# Patient Record
Sex: FEMALE
[De-identification: names, ages, dates, MRNs, and addresses within clinical notes are randomized per-mention and may not be internally consistent; named-entity substitution may affect disease eponyms.]

---

## 2018-11-15 ENCOUNTER — HOSPITAL ENCOUNTER (INPATIENT)
Dept: HOSPITAL 14 - H.ER | Age: 58
LOS: 2 days | Discharge: HOME | DRG: 343 | End: 2018-11-17
Payer: COMMERCIAL

## 2018-11-15 DIAGNOSIS — K35.80: Primary | ICD-10-CM

## 2018-11-15 DIAGNOSIS — N28.1: ICD-10-CM

## 2018-11-15 DIAGNOSIS — R10.11: ICD-10-CM

## 2018-11-15 DIAGNOSIS — E78.00: ICD-10-CM

## 2018-11-15 DIAGNOSIS — E78.5: ICD-10-CM

## 2018-11-15 DIAGNOSIS — Z79.02: ICD-10-CM

## 2018-11-15 DIAGNOSIS — Z86.73: ICD-10-CM

## 2018-11-15 DIAGNOSIS — I25.10: ICD-10-CM

## 2018-11-15 DIAGNOSIS — D25.9: ICD-10-CM

## 2018-11-15 DIAGNOSIS — I10: ICD-10-CM

## 2018-11-15 LAB
ALBUMIN SERPL-MCNC: 4.4 G/DL (ref 3.5–5)
ALBUMIN/GLOB SERPL: 1.3 {RATIO} (ref 1–2.1)
ALT SERPL-CCNC: 32 U/L (ref 9–52)
AST SERPL-CCNC: 27 U/L (ref 14–36)
BASOPHILS # BLD AUTO: 0 K/UL (ref 0–0.2)
BASOPHILS NFR BLD: 0.2 % (ref 0–2)
BUN SERPL-MCNC: 14 MG/DL (ref 7–17)
CALCIUM SERPL-MCNC: 9.4 MG/DL (ref 8.4–10.2)
EOSINOPHIL # BLD AUTO: 0.1 K/UL (ref 0–0.7)
EOSINOPHIL NFR BLD: 0.4 % (ref 0–4)
ERYTHROCYTE [DISTWIDTH] IN BLOOD BY AUTOMATED COUNT: 13.8 % (ref 11.5–14.5)
GFR NON-AFRICAN AMERICAN: > 60
HGB BLD-MCNC: 13.2 G/DL (ref 12–16)
LIPASE SERPL-CCNC: 55 U/L (ref 23–300)
LYMPHOCYTE: 10 % (ref 20–50)
LYMPHOCYTES # BLD AUTO: 1.3 K/UL (ref 1–4.3)
LYMPHOCYTES NFR BLD AUTO: 9.1 % (ref 20–40)
MCH RBC QN AUTO: 28 PG (ref 27–31)
MCHC RBC AUTO-ENTMCNC: 31.6 G/DL (ref 33–37)
MCV RBC AUTO: 88.8 FL (ref 81–99)
MONOCYTE: 5 % (ref 0–10)
MONOCYTES # BLD: 0.8 K/UL (ref 0–0.8)
MONOCYTES NFR BLD: 5.6 % (ref 0–10)
NEUTROPHILS # BLD: 12.3 K/UL (ref 1.8–7)
NEUTROPHILS NFR BLD AUTO: 84.7 % (ref 50–75)
NEUTROPHILS NFR BLD AUTO: 85 % (ref 42–75)
NRBC BLD AUTO-RTO: 0.2 % (ref 0–0)
PLATELET # BLD EST: NORMAL 10*3/UL
PLATELET # BLD: 356 K/UL (ref 130–400)
PMV BLD AUTO: 7.5 FL (ref 7.2–11.7)
RBC # BLD AUTO: 4.69 MIL/UL (ref 3.8–5.2)
RBC MORPH BLD: NORMAL
TOTAL CELLS COUNTED BLD: 100
WBC # BLD AUTO: 14.5 K/UL (ref 4.8–10.8)

## 2018-11-15 NOTE — ED PDOC
HPI: Abdomen


Time Seen by Provider: 11/15/18 18:26


Chief Complaint (Nursing): Abdominal Pain


Chief Complaint (Provider): abdominal pain


History Per: Patient


History/Exam Limitations: no limitations


Onset/Duration Of Symptoms: Hrs (x9)


Current Symptoms Are (Timing): Still Present


Location Of Pain/Discomfort: Other (upper)


Quality Of Discomfort: Sharp


Associated Symptoms: Nausea, Diarrhea (x1).  denies: Fever, Chills, Vomiting, 

Chest Pain, Urinary Symptoms


Additional Complaint(s): 





Ramona Tolbert is a 58 year old female, with a past medical history of CAD, HTN, 

and hypercholesterolemia, who presents to the emergency department complaining 

of upper abdominal pain onset for the last x9 hrs. Patient reports a 10/10 sharp

pain that shoots across bilateral upper quadrants and is associated with nausea 

and x1 episode of non bloody diarrhea. Patient states she's had some episodes of

dry-heaving but hasn't actually vomited food. She took no medications for 

symptoms prior to arrival and denies any prior history of similar symptoms. She 

denies any fever, chills, urinary symptoms, hematuria, vaginal bleeding or 

discharge, chest pain, shortness of breath, cough, headache, dizziness, recent 

travel, sick contacts or prior abdominal surgeries. No further medical 

complaints.





PMD: Nelson Abraham





Past Medical History


Reviewed: Historical Data, Nursing Documentation, Vital Signs


Vital Signs: 





                                Last Vital Signs











Temp  96.2 F L  11/15/18 18:04


 


Pulse  104 H  11/15/18 18:04


 


Resp  16   11/15/18 18:04


 


BP  143/72   11/15/18 18:04


 


Pulse Ox  100   11/15/18 18:04














- Medical History


PMH: CAD, Hypercholesterolemia





- Surgical History


Other surgeries: knee arthroscopy





- Family History


Family History: States: Unknown Family Hx





- Home Medications


Home Medications: 


                                Ambulatory Orders











 Medication  Instructions  Recorded


 


Clopidogrel [Plavix] 75 mg PO DAILY 11/15/18


 


RX: Pravastatin Sodium [Pravachol] 40 mg PO HS 11/15/18














- Allergies


Allergies/Adverse Reactions: 


                                    Allergies











Allergy/AdvReac Type Severity Reaction Status Date / Time


 


No Known Allergies Allergy   Verified 11/15/18 18:04














Review of Systems


ROS Statement: Except As Marked, All Systems Reviewed And Found Negative


Constitutional: Negative for: Fever, Chills


Cardiovascular: Negative for: Chest Pain


Respiratory: Negative for: Cough, Shortness of Breath


Gastrointestinal: Positive for: Nausea, Abdominal Pain, Diarrhea (x1).  Negative

 for: Vomiting


Genitourinary Female: Negative for: Dysuria, Frequency, Incontinence, Hematuria,

 Vaginal Discharge, Vaginal Bleeding


Neurological: Negative for: Headache, Dizziness





Physical Exam





- Reviewed


Nursing Documentation Reviewed: Yes


Vital Signs Reviewed: Yes





- Physical Exam


Comments: 


GENERAL APPEARANCE: Patient is awake, alert, oriented x 3, in mild painful distr

ess. 


SKIN:  Warm, dry; (-) cyanosis.


EYES:  (-) conjunctival pallor, (-) scleral icterus.


ENMT:  Mucous membranes moist.


NECK:  Supple, FROM


CHEST AND RESPIRATORY:  (-) rales, (-) rhonchi, (-) wheezes; breath sounds equal

 bilaterally. Respirations even and nonlabored. 


HEART AND CARDIOVASCULAR:  (-) irregularity


ABDOMEN AND GI: Soft (-) distention.  Bowel sounds active x4; [+] tenderness in 

right and left upper quadrants. (-) guarding, (-) rebound, (-) palpable masses, 

(-) CVA tenderness.


EXTREMITIES:  (-) deformity, (-) edema


NEURO AND PSYCH:  Mental status as above; (-) focal findings. Gait: steady. 

Speech: clear.





- Laboratory Results


Result Diagrams: 


                                 11/15/18 19:30





                                 11/15/18 19:30


Urine dip results: Negative for: Leukocyte Esterase, Blood, Nitrate, Ketones, 

Glucose, Bilirubin, Protein





- ECG


O2 Sat by Pulse Oximetry: 100 (RA)


Pulse Ox Interpretation: Normal





Medical Decision Making


Medical Decision Making: 


Time: 18:25


Initial Impression: abdominal pain and nausea, r/o gallbladder disease


Initial plan:


--CMP


--Lipase


--Urine dipstick


--CBC w/ differential


--Morphine 2 mg IVP


--Sodium Chloride 1,000 ml  mls/hr


--Protonix Inj 40 mg IVP


--Zofran inj 4 mg IVP


--Abdomen Limited [US]


--Reevaluation





2020


CBC with leukocytosis. CMP grossly unremarkable. Elevated lactate 2.6. Lipase 

WNL.


Repeat HR: 98


Pending U/S report.


On re-evaluation, patient resting comfortably - reports resolution of nausea and

 slight improvement in pain.








20:45


Abdomen Ultrasound


COMMENTS: 


Excessive gas is visualized. 


The liver is of uniform echo texture without evidence of mass or defect and 

appears slightly enlarged measuring 18.37 cm.  There is no intra or extrahepatic

 biliary ductal dilatation.  The common bile duct measures 0.51 cm.  The 

gallbladder is physiologically distended without evidence of calculi. The 

gallbladder wall is not thickened and there is no pericholecystic fluid.   


The visualized portions of abdominal aorta and inferior vena cava present no 

abnormalities. 


The visualized portions of the pancreas are unremarkable.  The spleen is of 

uniform echo texture and does not appear enlarged measuring 11.5 cm.  The right 

kidney measures 9.71 x 3.83 x 4.2 cm and the left kidney measures 10.08 x 3.84 x

 4.12 cm.  Right mid pole renal cyst measures 1.88 x 1.93 x 2.03 cm.  Both k

idneys are free of hydronephrosis. 


IMPRESSION: 


1.  Slightly enlarged liver. 


2.  Right renal cyst.





Ultrasound reviewed, CT ordered to further r/o abdominal pathology.


Patient to remain NPO.





2245


EXAM: 


CT Abdomen and Pelvis with IV contrast 


CLINICAL HISTORY: 


Abd pain  nausea 


TECHNIQUE: 


Axial computed tomography images of the abdomen and pelvis with intravenous 

contrast. 


CONTRAST: 


With intravenous contrast. 


COMPARISON: 


None provided. 


FINDINGS: 


LUNG BASES: 


The lung bases appear clear. No pleural effusions are seen. 


LIVER: 


Unremarkable. 


GALLBLADDER AND BILE DUCTS: 


The gallbladder appears within normal limits. No radioopaque gallstones are 

seen. No biliary ductal dilatation is evident. 


PANCREAS: 


Unremarkable. 


SPLEEN: 


Unremarkable. 


ADRENAL GLANDS: 


Unremarkable. 


KIDNEYS, URETERS, AND BLADDER: 


The kidneys appear within normal limits. There is no hydronephrosis or hydr

oureter. No urinary calculi are seen. Several cysts are present in the right 

kidney largest measures 2 cm. 


STOMACH AND BOWEL: 


Unremarkable appearance of the stomach and bowel. No evidence of bowel o

bstruction. No evidence suggesting enteritis or colitis. 


APPENDIX: 


Thick-walled fluid-filled inflamed appendix with more adjacent stranding 

compatible with acute appendicitis. No perforation or abscess. 


PERITONEUM: 


No free fluid. No free air. 


LYMPH NODES: 


No lymphadenopathy is evident. 


REPRODUCTIVE: 


Uterus is enlarged bulky and contains multiple masses consistent with fibroids 


VASCULATURE: 


No evidence of abdominal aortic aneurysm. 


BONES: 


No aggressive appearing osseous lesion. No acute osseous pathology evident. 


Large herniated discs at L5-S1 and L4-L5 with biforaminal and canal stenosis. 





IMPRESSION: 


1. Acute appendicitis. No perforation or abscess. 


2. Fibroid  uterus. 


Findings reported to JACY Yap 10:32pm EST.


Electronically signed on Nov 15, 2018 10:34:22 PM EST by:


Lobito Kuhn M.D., MBA Certified By [facility] & [facility]


Fellowship Trained MRI and CT Specialist





Consult placed to surgical resident.


Zosyn IVPB ordered.





2305


Case discussed with Dr CORRINE Carrillo, surgical resident. Agreeable to evaluation in

 ED.





2310


Case discussed with general surgeon on call, Dr Montelongo. Agreeable to admission 

to med/surg.


Patient agreeable to admission. Arrangements made for admission.


Udip reviewed and unremarkable.


----------------------------------------------------

---------------------------------------------   


Scribe Attestation:


Documented by Justo Barone, acting as a scribe for Renetta Yap PA-C





Provider Scribe Attestation:


All medical record entries made by the Scribe were at my direction and 

personally dictated by me. I have reviewed the chart and agree that the record 

accurately reflects my personal performance of the history, physical exam, 

medical decision making, and the department course for this patient. I have also

 personally directed, reviewed, and agree with the discharge instructions and 

disposition.





Disposition





- Clinical Impression


Clinical Impression: 


 Appendicitis








- Patient ED Disposition


Is Patient to be Admitted: Yes


Counseled Patient/Family Regarding: Studies Performed, Diagnosis





- Disposition


Disposition Time: 23:13


Condition: FAIR





- Pt Status Changed To:


Hospital Disposition Of: Inpatient





- Admit Certification


Admit to Inpatient:: After my assessment, the patient will require 

hospitalization for at least two midnights.  This is because of the severity of 

symptoms shown, intensity of services needed, and/or the medical risk in this 

patient being treated as an outpatient.





- POA


Present On Arrival: None





Results





- Lab Results


Lab Results: 

















  11/15/18 11/15/18 11/15/18





  19:30 19:30 19:30


 


WBC    14.5 H


 


RBC    4.69


 


Hgb    13.2


 


Hct    41.6


 


MCV    88.8


 


MCH    28.0


 


MCHC    31.6 L


 


RDW    13.8


 


Plt Count    356


 


MPV    7.5


 


Neut % (Auto)    84.7 H


 


Lymph % (Auto)    9.1 L


 


Mono % (Auto)    5.6


 


Eos % (Auto)    0.4


 


Baso % (Auto)    0.2


 


Neut # (Auto)    12.3 H


 


Lymph # (Auto)    1.3


 


Mono # (Auto)    0.8


 


Eos # (Auto)    0.1


 


Baso # (Auto)    0.0


 


Neutrophils % (Manual)    85 H


 


Lymphocytes % (Manual)    10 L


 


Monocytes % (Manual)    5


 


Platelet Estimate    Normal


 


RBC Morphology    Normal


 


Sodium   138 


 


Potassium   4.9 


 


Chloride   105 


 


Carbon Dioxide   22 


 


Anion Gap   16 


 


BUN   14 


 


Creatinine   0.6 L 


 


Est GFR ( Amer)   > 60 


 


Est GFR (Non-Af Amer)   > 60 


 


Random Glucose   96 


 


Lactic Acid  2.6 H  


 


Calcium   9.4 


 


Total Bilirubin   0.8 


 


AST   27 


 


ALT   32 


 


Alkaline Phosphatase   81 


 


Total Protein   7.9 


 


Albumin   4.4 


 


Globulin   3.5 


 


Albumin/Globulin Ratio   1.3 


 


Lipase   55

## 2018-11-15 NOTE — CP.PCM.CON
History of Present Illness





- History of Present Illness


History of Present Illness: 





General Surgery Consult note for Dr. Montelongo


   Consulted for Appendicitis





Patient is a 58 yr old female with PMH HLD, carotid artery stenosis and TIA six 

years ago who presented to [facility] ED with sharp RLQ abdominal pain beginning this 

am upon awakening radiating to the RUQ.  The pain is associated with nausea and 

NBNB emesis x2.  Last normal BM this morning soft, brown, no blood. Patient 

denies f/c, HA, dizziness, CP, sob, extremity pain or weakness. 


   


Of note the patient was started on Plavix approximately 2 months ago by her car

diologist (Dr. Llanos) for stroke prevention.





PMH: HLD, carotid artery stenosis 50-69%, TIA 2012


PSH: right knee arthroscopy


All: NKDA


Medications: Plavix, Statin 





Review of Systems





- Review of Systems


All systems: reviewed and no additional remarkable complaints except (as per 

HPI)





Past Patient History





- Past Social History


Smoking Status: Never Smoked





- CARDIAC


Hx Hypercholesterolemia: Yes





- PSYCHIATRIC


Hx Substance Use: No





Meds


Allergies/Adverse Reactions: 


                                    Allergies











Allergy/AdvReac Type Severity Reaction Status Date / Time


 


No Known Allergies Allergy   Verified 11/15/18 18:04














- Medications


Medications: 


                               Current Medications





Piperacillin Sod/Tazobactam (Sod 3.375 gm/ Sodium Chloride)  100 mls @ 100 

mls/hr IVPB STAT STA; Protocol


   Stop: 11/15/18 23:41


   Last Admin: 11/15/18 22:46 Dose:  100 mls/hr











Physical Exam





- Constitutional


Appears: Well, Non-toxic, No Acute Distress





- Head Exam


Head Exam: ATRAUMATIC, NORMOCEPHALIC





- Eye Exam


Eye Exam: EOMI





- ENT Exam


ENT Exam: Mucous Membranes Moist





- Respiratory Exam


Respiratory Exam: NORMAL BREATHING PATTERN





- Cardiovascular Exam


Cardiovascular Exam: REGULAR RHYTHM





- GI/Abdominal Exam


GI & Abdominal Exam: Guarding (RLQ), Rebound, Soft, Tenderness (RLQ and RUQ 

tenderness).  absent: Distended, Rigid


Additional comments: 





Rovsing sign positive





- Extremities Exam


Extremities exam: Positive for: pedal pulses present.  Negative for: calf 

tenderness, pedal edema





- Neurological Exam


Neurological exam: Alert, Oriented x3





- Psychiatric Exam


Psychiatric exam: Anxious





- Skin


Skin Exam: Dry, Intact, Normal Color, Warm





Results





- Vital Signs


Recent Vital Signs: 


                                Last Vital Signs











Temp  98.9 F   11/15/18 20:23


 


Pulse  98 H  11/15/18 20:23


 


Resp  16   11/15/18 20:23


 


BP  137/74   11/15/18 20:23


 


Pulse Ox  100   11/15/18 23:31














- Labs


Result Diagrams: 


                                 11/15/18 19:30





                                 11/15/18 19:30


Labs: 


                         Laboratory Results - last 24 hr











  11/15/18 11/15/18 11/15/18





  19:30 19:30 19:30


 


WBC  14.5 H  


 


RBC  4.69  


 


Hgb  13.2  


 


Hct  41.6  


 


MCV  88.8  


 


MCH  28.0  


 


MCHC  31.6 L  


 


RDW  13.8  


 


Plt Count  356  


 


MPV  7.5  


 


Neut % (Auto)  84.7 H  


 


Lymph % (Auto)  9.1 L  


 


Mono % (Auto)  5.6  


 


Eos % (Auto)  0.4  


 


Baso % (Auto)  0.2  


 


Neut # (Auto)  12.3 H  


 


Lymph # (Auto)  1.3  


 


Mono # (Auto)  0.8  


 


Eos # (Auto)  0.1  


 


Baso # (Auto)  0.0  


 


Neutrophils % (Manual)  85 H  


 


Lymphocytes % (Manual)  10 L  


 


Monocytes % (Manual)  5  


 


Platelet Estimate  Normal  


 


RBC Morphology  Normal  


 


Sodium   138 


 


Potassium   4.9 


 


Chloride   105 


 


Carbon Dioxide   22 


 


Anion Gap   16 


 


BUN   14 


 


Creatinine   0.6 L 


 


Est GFR ( Amer)   > 60 


 


Est GFR (Non-Af Amer)   > 60 


 


Random Glucose   96 


 


Lactic Acid    2.6 H


 


Calcium   9.4 


 


Total Bilirubin   0.8 


 


AST   27 


 


ALT   32 


 


Alkaline Phosphatase   81 


 


Total Protein   7.9 


 


Albumin   4.4 


 


Globulin   3.5 


 


Albumin/Globulin Ratio   1.3 


 


Lipase   55 














Assessment & Plan





- Assessment and Plan (Free Text)


Assessment: 





58 yr old female with appendicitis


Plan: 


c/w IVF


c/w Zosyn


pain control


SCD


protonix


repeat labs in AM


plan for OR tomorrow afternoon


d/w Dr. Reginald Carrillo, PGY 1








- Date & Time


Date: 11/15/18


Time: 23:15

## 2018-11-16 LAB
ALBUMIN SERPL-MCNC: 3.9 G/DL (ref 3.5–5)
ALBUMIN/GLOB SERPL: 1.3 {RATIO} (ref 1–2.1)
ALT SERPL-CCNC: 33 U/L (ref 9–52)
APTT BLD: 29.2 SECONDS (ref 25.6–37.1)
AST SERPL-CCNC: 17 U/L (ref 14–36)
BASOPHILS # BLD AUTO: 0.1 K/UL (ref 0–0.2)
BASOPHILS NFR BLD: 0.7 % (ref 0–2)
BUN SERPL-MCNC: 11 MG/DL (ref 7–17)
CALCIUM SERPL-MCNC: 9.1 MG/DL (ref 8.4–10.2)
EOSINOPHIL # BLD AUTO: 0 K/UL (ref 0–0.7)
EOSINOPHIL NFR BLD: 0.3 % (ref 0–4)
ERYTHROCYTE [DISTWIDTH] IN BLOOD BY AUTOMATED COUNT: 13.4 % (ref 11.5–14.5)
GFR NON-AFRICAN AMERICAN: > 60
HGB BLD-MCNC: 12.1 G/DL (ref 12–16)
INR PPP: 1.1
LYMPHOCYTES # BLD AUTO: 2.1 K/UL (ref 1–4.3)
LYMPHOCYTES NFR BLD AUTO: 14.2 % (ref 20–40)
MCH RBC QN AUTO: 27.5 PG (ref 27–31)
MCHC RBC AUTO-ENTMCNC: 32.4 G/DL (ref 33–37)
MCV RBC AUTO: 84.9 FL (ref 81–99)
MONOCYTES # BLD: 0.8 K/UL (ref 0–0.8)
MONOCYTES NFR BLD: 5.6 % (ref 0–10)
NEUTROPHILS # BLD: 11.6 K/UL (ref 1.8–7)
NEUTROPHILS NFR BLD AUTO: 79.2 % (ref 50–75)
NRBC BLD AUTO-RTO: 0 % (ref 0–0)
PLATELET # BLD: 318 K/UL (ref 130–400)
PMV BLD AUTO: 7.2 FL (ref 7.2–11.7)
PROTHROMBIN TIME: 12.4 SECONDS (ref 9.8–13.1)
RBC # BLD AUTO: 4.4 MIL/UL (ref 3.8–5.2)
WBC # BLD AUTO: 14.6 K/UL (ref 4.8–10.8)

## 2018-11-16 PROCEDURE — 0DTJ4ZZ RESECTION OF APPENDIX, PERCUTANEOUS ENDOSCOPIC APPROACH: ICD-10-PCS

## 2018-11-16 RX ADMIN — SODIUM CHLORIDE, SODIUM LACTATE, POTASSIUM CHLORIDE, CALCIUM CHLORIDE AND DEXTROSE MONOHYDRATE SCH MLS/HR: 5; 600; 310; 30; 20 INJECTION, SOLUTION INTRAVENOUS at 01:23

## 2018-11-16 RX ADMIN — SODIUM CHLORIDE, SODIUM LACTATE, POTASSIUM CHLORIDE, CALCIUM CHLORIDE AND DEXTROSE MONOHYDRATE SCH MLS/HR: 5; 600; 310; 30; 20 INJECTION, SOLUTION INTRAVENOUS at 14:35

## 2018-11-16 RX ADMIN — SODIUM CHLORIDE, SODIUM LACTATE, POTASSIUM CHLORIDE, CALCIUM CHLORIDE AND DEXTROSE MONOHYDRATE SCH MLS/HR: 5; 600; 310; 30; 20 INJECTION, SOLUTION INTRAVENOUS at 09:18

## 2018-11-16 NOTE — CT
Date of service: 



11/15/2018



PROCEDURE:  CT Abdomen and Pelvis with contrast



HISTORY:

abd pain, nausea



COMPARISON:

Limited abdomen ultrasound 11/15/2018.



TECHNIQUE:

Following the intravenous administration of iodinated contrast 

material, a CT examination of the abdomen and pelvis performed from 

the domes of the diaphragms to the symphysis pubis with reformatted 

datasets provided in axial, sagittal and coronal planes. Oral 

contrast was not administered as per referring physician request. 

Coronal and sagittal reformats were generated.



Contrast dose: Omnipaque 300, 95 cc



Radiation dose:



Total exam DLP = 865.09 mGy-cm.



This CT exam was performed using one or more of the following dose 

reduction techniques: Automated exposure control, adjustment of the 

mA and/or kV according to patient size, and/or use of iterative 

reconstruction technique.



FINDINGS:



LOWER THORAX:

Unremarkable. 



LIVER:

Diminished attenuation is appreciated throughout the liver compatible 

with hepatic steatosis.  There is a small ovoid enhancing mass at the 

dome posteriorly, measuring 1.6 x 1.2 cm with remainder of the liver 

remarkable only for mild hepatomegaly.  No intrahepatic biliary 

dilatation or follow-up MRI is advised without contrast for 

additional characterization of this finding. 



GALLBLADDER AND BILE DUCTS:

Unremarkable. 



PANCREAS:

Unremarkable. No gross lesion or ductal dilatation.



SPLEEN:

Unremarkable. 



ADRENALS:

Unremarkable. No mass. 



KIDNEYS AND URETERS:

No obstructive uropathy bilaterally or definite solid mass or 

perinephric reaction.  2.3 cm cyst seen at the upper midpole right 

kidney laterally with a small lucency seen at the mid to lower pole 

medially, too small to characterize. 



VASCULATURE:

Unremarkable. No aortic aneurysm. No aortic atherosclerotic 

calcification or mural plaque present.



BOWEL:

Unremarkable. No obstruction. No gross mural thickening. 



APPENDIX:

Thick-walled distended appendix is appreciate with associated 

periappendiceal reactive changes at the midline to right hemipelvis 

no associated ascites, free intra peritoneal gas collection or 

abscess.  Findings are compatible with appendicitis. 



PERITONEUM:

Unremarkable. No free fluid. No free air. 



LYMPH NODES:

Unremarkable. No enlarged lymph nodes. 



BLADDER:

Unremarkable. 



REPRODUCTIVE:

Extensive fibroid uterine changes are identified partially calcified. 



BONES:

No acute fracture. 



OTHER FINDINGS:

None.



IMPRESSION:

1. Findings compatible with appendicitis without CT sign of rupture 

at this time. 



2. Extensive fibroid uterine disease. 



3. Hepatomegaly and hepatic steatosis.  1.6 cm enhancing lesion at 

the dome of the liver requires follow-up MRI with and without 

contrast for added characterization.  This was obscured by lung base 

in preliminary limited abdomen ultrasound 11/15/2018.  No peripheral 

enhancement pattern to suggest definitive benign hemangioma by CT at 

the time of imaging. 



4. 0.3 cm simple cyst upper midpole right kidney with mid to lower 

pole parenchymal lucency right kidney too small to characterize. 



Discordant preliminary report from [facility] (Impression No.  Three, 

hepatic mass), 11/15/2018. 



Discussed with Dr. Montelongo with written down read back verification 

11/16/2018 11:55 a.m..

## 2018-11-16 NOTE — CARD
--------------- APPROVED REPORT --------------





Date of service: 11/16/2018



EKG Measurement

Heart [id number]

NE [id number]

[id number]

[id number]

[id number]



<Conclusion>

Normal sinus rhythm

Normal ECG

## 2018-11-16 NOTE — CP.PCM.HP
History of Present Illness





- History of Present Illness


History of Present Illness: 





General Surgery H&P note for Dr. Montelongo


   Consulted for Appendicitis





Patient is a 58 yr old female with PMH HLD, carotid artery stenosis and TIA six 

years ago who presented to [facility] ED with sharp RLQ abdominal pain beginning this 

am upon awakening radiating to the RUQ.  The pain is associated with nausea and 

NBNB emesis x2.  Last normal BM this morning soft, brown, no blood. Patient 

denies f/c, HA, dizziness, CP, sob, extremity pain or weakness. 


   


Of note the patient was started on Plavix approximately 2 months ago by her 

cardiologist (Dr. Llanos) for stroke prevention.





PMH: HLD, carotid artery stenosis 50-69%, TIA 2012


PSH: right knee arthroscopy


All: NKDA


Medications: Plavix, Statin 








Present on Admission





- Present on Admission


Any Indicators Present on Admission: No





Review of Systems





- Review of Systems


All systems: reviewed and no additional remarkable complaints except (as per 

HPI)





Past Patient History





- Past Social History


Smoking Status: Never Smoked





- CARDIAC


Hx Hypercholesterolemia: Yes





- PSYCHIATRIC


Hx Substance Use: No





Meds


Allergies/Adverse Reactions: 


                                    Allergies











Allergy/AdvReac Type Severity Reaction Status Date / Time


 


No Known Allergies Allergy   Verified 11/15/18 18:04














Physical Exam





- Constitutional


Appears: Well, Non-toxic, No Acute Distress





- Head Exam


Head Exam: ATRAUMATIC, NORMOCEPHALIC





- Eye Exam


Eye Exam: EOMI





- ENT Exam


ENT Exam: Mucous Membranes Moist





- Respiratory Exam


Respiratory Exam: NORMAL BREATHING PATTERN





- Cardiovascular Exam


Cardiovascular Exam: REGULAR RHYTHM





- GI/Abdominal Exam


GI & Abdominal Exam: Guarding, Rebound, Soft, Tenderness (RLQ, RUQ).  absent: 

Distended, Firm, Rigid





- Extremities Exam


Extremities exam: Positive for: pedal pulses present.  Negative for: calf 

tenderness, pedal edema





- Neurological Exam


Neurological exam: Alert, Oriented x3





- Psychiatric Exam


Psychiatric exam: Normal Affect, Normal Mood





- Skin


Skin Exam: Dry, Intact, Normal Color, Warm





Results





- Vital Signs


Recent Vital Signs: 





                                Last Vital Signs











Temp  98.9 F   11/15/18 20:23


 


Pulse  98 H  11/15/18 20:23


 


Resp  16   11/15/18 20:23


 


BP  137/74   11/15/18 20:23


 


Pulse Ox  100   11/15/18 23:31














- Labs


Result Diagrams: 


                                 11/15/18 19:30





                                 11/15/18 19:30


Labs: 





                         Laboratory Results - last 24 hr











  11/15/18 11/15/18 11/15/18





  19:30 19:30 19:30


 


WBC  14.5 H  


 


RBC  4.69  


 


Hgb  13.2  


 


Hct  41.6  


 


MCV  88.8  


 


MCH  28.0  


 


MCHC  31.6 L  


 


RDW  13.8  


 


Plt Count  356  


 


MPV  7.5  


 


Neut % (Auto)  84.7 H  


 


Lymph % (Auto)  9.1 L  


 


Mono % (Auto)  5.6  


 


Eos % (Auto)  0.4  


 


Baso % (Auto)  0.2  


 


Neut # (Auto)  12.3 H  


 


Lymph # (Auto)  1.3  


 


Mono # (Auto)  0.8  


 


Eos # (Auto)  0.1  


 


Baso # (Auto)  0.0  


 


Neutrophils % (Manual)  85 H  


 


Lymphocytes % (Manual)  10 L  


 


Monocytes % (Manual)  5  


 


Platelet Estimate  Normal  


 


RBC Morphology  Normal  


 


Sodium   138 


 


Potassium   4.9 


 


Chloride   105 


 


Carbon Dioxide   22 


 


Anion Gap   16 


 


BUN   14 


 


Creatinine   0.6 L 


 


Est GFR ( Amer)   > 60 


 


Est GFR (Non-Af Amer)   > 60 


 


Random Glucose   96 


 


Lactic Acid    2.6 H


 


Calcium   9.4 


 


Total Bilirubin   0.8 


 


AST   27 


 


ALT   32 


 


Alkaline Phosphatase   81 


 


Total Protein   7.9 


 


Albumin   4.4 


 


Globulin   3.5 


 


Albumin/Globulin Ratio   1.3 


 


Lipase   55 














Assessment & Plan





- Assessment and Plan (Free Text)


Assessment: 


58 yr old female with appendicitis





Plan: 





IVF


continue Zosyn


pain control


SCD


protonix


repeat labs in AM


plan for OR tomorrow afternoon


d/w Dr. Reginald Carrillo, PGY 1








- Date & Time


Date: 11/15/18


Time: 23:15





Decision To Admit





- Pt Status Changed To:


Hospital Disposition Of: Inpatient





- Admit Certification


Admit to Inpatient:: After my assessment, the patient will require 

hospitalization for at least two midnights.  This is because of the severity of 

symptoms shown, intensity of services needed, and/or the medical risk in this 

patient being treated as an outpatient.





- .


Bed Request Type: Med/Surg


Admitting Physician: Bebeto Montelongo

## 2018-11-16 NOTE — RAD
Date of service: 



11/16/2018



HISTORY:

 preop 



COMPARISON:

None available.



FINDINGS:



LUNGS:

No active pulmonary disease.



PLEURA:

No significant pleural effusion identified, no pneumothorax apparent.



CARDIOVASCULAR:

No aortic atherosclerotic calcification present.



 Normal cardiac size. No pulmonary vascular congestion. 



OSSEOUS STRUCTURES:

No significant abnormalities.



VISUALIZED UPPER ABDOMEN:

Normal.



OTHER FINDINGS:

None.



IMPRESSION:

No acute cardiopulmonary disease appreciated.

## 2018-11-16 NOTE — US
Date of service: 



11/15/2018



HISTORY:

RUQ pain, r/o gallbladder disease



COMPARISON:

None.



TECHNIQUE:

Sonographic evaluation of the right upper quadrant of the abdomen.



FINDINGS:



LIVER:

Measures 18.4 cm in length. Patent portal vein. Portal venous flow: 

Hepatopetal. Unremarkable echogenicity of the liver parenchyma.  No 

mass. No intrahepatic bile duct dilatation. 



GALLBLADDER:

Unremarkable. No gallstones. 



COMMON BILE DUCT:

Measures 5.1 mm.  No stones. No dilatation.



PANCREAS:

Unremarkable as visualized. No mass. No ductal dilatation.



RIGHT KIDNEY:

Measures 3.8 x 9.7 cm in length. Normal echogenicity. No calculus, 

mass, or hydronephrosis.  Midpole cyst 1.9 x 1.9 cm 



AORTA:

No aneurysmal dilatation.



IVC:

Unremarkable.



OTHER FINDINGS:

None .



IMPRESSION:

No significant or acute  findings to account for/ related to the 

clinical presentation. Additional benign and/or incidental findings 

described above.



___________________________________________________



Concordant findings (preliminary report) provided by [facility].

## 2018-11-16 NOTE — PCM.[email or web address]
Surgeon's Initial Post Op Note





- Surgeon's Notes


Surgeon: Reginald


Assistant: Titi


Type of Anesthesia: General Endo


Pre-Operative Diagnosis: Appendicitis


Operative Findings: Acutely inflamed appendix


Post-Operative Diagnosis: same


Operation Performed: laparoscopic appendectomy


Specimen/Specimens Removed: appendix


Estimated Blood Loss: EBL {In ML}: 20


Blood Products Given: N/A


Drains Used: No Drains


Post-Op Condition: Good


Date of Surgery/Procedure: 11/16/18


Time of Surgery/Procedure: 18:53

## 2018-11-17 VITALS — SYSTOLIC BLOOD PRESSURE: 155 MMHG | DIASTOLIC BLOOD PRESSURE: 79 MMHG

## 2018-11-17 VITALS — RESPIRATION RATE: 20 BRPM | HEART RATE: 82 BPM | TEMPERATURE: 98.8 F | OXYGEN SATURATION: 100 %

## 2018-11-17 LAB
BASOPHILS # BLD AUTO: 0 K/UL (ref 0–0.2)
BASOPHILS NFR BLD: 0.3 % (ref 0–2)
BUN SERPL-MCNC: 14 MG/DL (ref 7–17)
CALCIUM SERPL-MCNC: 9.5 MG/DL (ref 8.4–10.2)
EOSINOPHIL # BLD AUTO: 0 K/UL (ref 0–0.7)
EOSINOPHIL NFR BLD: 0 % (ref 0–4)
ERYTHROCYTE [DISTWIDTH] IN BLOOD BY AUTOMATED COUNT: 13.3 % (ref 11.5–14.5)
GFR NON-AFRICAN AMERICAN: > 60
HGB BLD-MCNC: 11.1 G/DL (ref 12–16)
LYMPHOCYTES # BLD AUTO: 1.2 K/UL (ref 1–4.3)
LYMPHOCYTES NFR BLD AUTO: 11.4 % (ref 20–40)
MCH RBC QN AUTO: 28 PG (ref 27–31)
MCHC RBC AUTO-ENTMCNC: 33.2 G/DL (ref 33–37)
MCV RBC AUTO: 84.3 FL (ref 81–99)
MONOCYTES # BLD: 0.3 K/UL (ref 0–0.8)
MONOCYTES NFR BLD: 2.8 % (ref 0–10)
NEUTROPHILS # BLD: 9.1 K/UL (ref 1.8–7)
NEUTROPHILS NFR BLD AUTO: 85.5 % (ref 50–75)
NRBC BLD AUTO-RTO: 0.1 % (ref 0–0)
PLATELET # BLD: 322 K/UL (ref 130–400)
PMV BLD AUTO: 7.4 FL (ref 7.2–11.7)
RBC # BLD AUTO: 3.98 MIL/UL (ref 3.8–5.2)
WBC # BLD AUTO: 10.7 K/UL (ref 4.8–10.8)

## 2018-11-18 NOTE — OP
PROCEDURE DATE:  11/16/2018



SURGEON:  Bebeto Montelongo MD



ASSISTANT:  Lobito Walls DO



ANESTHESIA:  General.



ANESTHESIOLOGIST:  Dr. CORRINE Ross.



PREOPERATIVE DIAGNOSIS:  Acute appendicitis



POSTOPERATIVE DIAGNOSIS:  Acute appendicitis



PROCEDURE:  Laparoscopic appendectomy.



DESCRIPTION OF OPERATION:  With the patient in the supine position, under

adequate general anesthesia, the abdomen was prepped and draped in the

usual sterile manner.  Veress needle puncture was performed at the

umbilicus with insufflation to 15 cm water pressure of CO2 and a 5 mm

laparoscopic trocar was inserted via an infraumbilical incision.  Under

direct vision, 5 and 12 mm trocars were inserted in the left lower

quadrant.  The cecum was visualized.  It was noted medial within the

abdomen and was redundant.  The patient was placed in Trendelenburg

position and with elevation of the cecum, the appendix was identified.  It

appeared acutely inflamed and thickened and was adherent to the small bowel

mesentery beneath the cecum in its proximal portion and curved back and was

adherent to the lateral wall of the cecum distally.  The appendix was

grasped and elevated and dissected free of attachments using a combination

of the harmonic scalpel and blunt dissection.  The mesentery of the

appendix was then divided also using the harmonic scalpel with hemoclips at

the area of the appendiceal artery.  The appendix was then divided close to

the cecum using an Endo-MUSTAPHA stapler.  The appendix was placed in a specimen

retrieval bag and removed via the 12 mm port site.  The abdomen was gently

irrigated and suctioned and inspected for hemostasis.  The pneumoperitoneum

was released and the trocars were removed.  The 12 mm port site was closed

with a figure-of-eight fascial suture of 0 Vicryl.  All incisions were

closed with 4-0 Monocryl subcuticular sutures and Dermabond.  Dry sterile

dressings were applied.  The patient tolerated the procedure well and

transferred to the recovery room in stable condition.  Estimated blood loss

for the procedure was 20 mL.





__________________________________________

Bebeto Montelongo MD





DD:  11/18/2018 9:25:17

DT:  11/18/2018 9:28:26

Job # [id number]

## 2018-11-24 ENCOUNTER — HOSPITAL ENCOUNTER (EMERGENCY)
Dept: HOSPITAL 14 - H.ER | Age: 58
Discharge: HOME | End: 2018-11-24
Payer: COMMERCIAL

## 2018-11-24 VITALS
DIASTOLIC BLOOD PRESSURE: 78 MMHG | SYSTOLIC BLOOD PRESSURE: 128 MMHG | TEMPERATURE: 97 F | HEART RATE: 78 BPM | RESPIRATION RATE: 19 BRPM

## 2018-11-24 DIAGNOSIS — I25.10: ICD-10-CM

## 2018-11-24 DIAGNOSIS — N39.0: ICD-10-CM

## 2018-11-24 DIAGNOSIS — R42: Primary | ICD-10-CM

## 2018-11-24 DIAGNOSIS — E78.00: ICD-10-CM

## 2018-11-24 LAB
ALBUMIN SERPL-MCNC: 4.5 G/DL (ref 3.5–5)
ALBUMIN/GLOB SERPL: 1.3 {RATIO} (ref 1–2.1)
ALT SERPL-CCNC: 28 U/L (ref 9–52)
APTT BLD: 32.3 SECONDS (ref 25.6–37.1)
AST SERPL-CCNC: 19 U/L (ref 14–36)
BACTERIA #/AREA URNS HPF: (no result) /[HPF]
BASOPHILS # BLD AUTO: 0.1 K/UL (ref 0–0.2)
BASOPHILS NFR BLD: 0.8 % (ref 0–2)
BILIRUB UR-MCNC: NEGATIVE MG/DL
BUN SERPL-MCNC: 17 MG/DL (ref 7–17)
CALCIUM SERPL-MCNC: 9.7 MG/DL (ref 8.4–10.2)
COLOR UR: YELLOW
EOSINOPHIL # BLD AUTO: 0.4 K/UL (ref 0–0.7)
EOSINOPHIL NFR BLD: 3.7 % (ref 0–4)
ERYTHROCYTE [DISTWIDTH] IN BLOOD BY AUTOMATED COUNT: 13.6 % (ref 11.5–14.5)
GFR NON-AFRICAN AMERICAN: > 60
GLUCOSE UR STRIP-MCNC: (no result) MG/DL
HGB BLD-MCNC: 13.6 G/DL (ref 12–16)
INR PPP: 1
LEUKOCYTE ESTERASE UR-ACNC: (no result) LEU/UL
LYMPHOCYTES # BLD AUTO: 2.6 K/UL (ref 1–4.3)
LYMPHOCYTES NFR BLD AUTO: 27.3 % (ref 20–40)
MCH RBC QN AUTO: 28.4 PG (ref 27–31)
MCHC RBC AUTO-ENTMCNC: 33.3 G/DL (ref 33–37)
MCV RBC AUTO: 85.1 FL (ref 81–99)
MONOCYTES # BLD: 0.7 K/UL (ref 0–0.8)
MONOCYTES NFR BLD: 7 % (ref 0–10)
NEUTROPHILS # BLD: 5.8 K/UL (ref 1.8–7)
NEUTROPHILS NFR BLD AUTO: 61.2 % (ref 50–75)
NRBC BLD AUTO-RTO: 0.1 % (ref 0–0)
PH UR STRIP: 6 [PH] (ref 5–8)
PLATELET # BLD: 386 K/UL (ref 130–400)
PMV BLD AUTO: 7.4 FL (ref 7.2–11.7)
PROT UR STRIP-MCNC: NEGATIVE MG/DL
PROTHROMBIN TIME: 11 SECONDS (ref 9.8–13.1)
RBC # BLD AUTO: 4.8 MIL/UL (ref 3.8–5.2)
RBC # UR STRIP: NEGATIVE /UL
SP GR UR STRIP: 1.02 (ref 1–1.03)
SQUAMOUS EPITHIAL: 1 /HPF (ref 0–5)
URINE AMORPHOUS SEDIMENT: (no result) /UL
URINE CLARITY: (no result)
UROBILINOGEN UR-MCNC: (no result) MG/DL (ref 0.2–1)
WBC # BLD AUTO: 9.4 K/UL (ref 4.8–10.8)

## 2018-11-24 PROCEDURE — 87086 URINE CULTURE/COLONY COUNT: CPT

## 2018-11-24 PROCEDURE — 85025 COMPLETE CBC W/AUTO DIFF WBC: CPT

## 2018-11-24 PROCEDURE — 80053 COMPREHEN METABOLIC PANEL: CPT

## 2018-11-24 PROCEDURE — 93005 ELECTROCARDIOGRAM TRACING: CPT

## 2018-11-24 PROCEDURE — 85610 PROTHROMBIN TIME: CPT

## 2018-11-24 PROCEDURE — 81003 URINALYSIS AUTO W/O SCOPE: CPT

## 2018-11-24 PROCEDURE — 96374 THER/PROPH/DIAG INJ IV PUSH: CPT

## 2018-11-24 PROCEDURE — 99285 EMERGENCY DEPT VISIT HI MDM: CPT

## 2018-11-24 PROCEDURE — 85730 THROMBOPLASTIN TIME PARTIAL: CPT

## 2018-11-24 NOTE — CARD
--------------- APPROVED REPORT --------------





Date of service: 11/24/2018



EKG Measurement

Heart [id number]

[id number]

[id number]

[id number]

[id number]



<Conclusion>

Normal sinus rhythm

Possible Left atrial enlargement

Borderline ECG

## 2018-11-24 NOTE — ED PDOC
HPI: Neurologic





- General


Time Seen by Provider: 18 08:33


Chief Complaint (Nursing): Dizziness/Lightheaded


Chief Complaint (Provider): Dizziness/Lightheaded


Source: patient


Exam Limitations: no limitations





- History of Present Illness


Timing/Duration: 1 week


Allergies/Adverse Reactions: 


Allergies





No Known Allergies Allergy (Verified 11/15/18 18:04)


   








Home Medications: 


Ambulatory Orders





Clopidogrel [Plavix] 75 mg PO DAILY 11/15/18 


Pravastatin Sodium [Pravachol] 40 mg PO HS 11/15/18 


Nitrofurantoin Macrocrystals [Macrobid] 100 mg PO BID #13 cap 18 








Additional Complaint(s): 


58 year old female s/p appendectomy on  complains of dizziness since. 

Patient was seen by Dr. Montelongo who did the surgery and was told to visit the ED 

if dizziness persists. Also complains of pain at the end of urination. She is 

eating regularly. Denies fever, nausea, vomiting, constipation, hematuria and 

diarrhea. 








PMD: Dr. Nelson Abraham 








Past Medical History


Reviewed: Historical Data, Nursing Documentation, Vital Signs


Vital Signs: 





                                Last Vital Signs











Temp  97.7 F   18 08:15


 


Pulse  97 H  18 08:15


 


Resp  18   18 08:15


 


BP  137/87   18 08:15


 


Pulse Ox  99   18 08:15














- Medical History


PMH: CAD, Hypercholesterolemia


   Denies: Chronic Kidney Disease





- Surgical History


Surgical History: Appendectomy





- Family History


Family History: States: Unknown Family Hx





- Home Medications


Home Medications: 


                                Ambulatory Orders











 Medication  Instructions  Recorded


 


Clopidogrel [Plavix] 75 mg PO DAILY 11/15/18


 


Pravastatin Sodium [Pravachol] 40 mg PO HS 11/15/18


 


Nitrofurantoin Macrocrystals 100 mg PO BID #13 cap 18





[Macrobid]  














- Allergies


Allergies/Adverse Reactions: 


                                    Allergies











Allergy/AdvReac Type Severity Reaction Status Date / Time


 


No Known Allergies Allergy   Verified 11/15/18 18:04














Review of Systems


ROS Statement: Except As Marked, All Systems Reviewed And Found Negative


Constitutional: Negative for: Fever


Gastrointestinal: Negative for: Nausea, Vomiting, Diarrhea, Constipation


Genitourinary Female: Positive for: Dysuria (at the end of urination).  Negative

 for: Hematuria


Neurological: Positive for: Dizziness





Physical Exam





- Reviewed


Nursing Documentation Reviewed: Yes


Vital Signs Reviewed: Yes





- Physical Exam


Appears: Positive for: Non-toxic, No Acute Distress


Head Exam: Positive for: ATRAUMATIC, NORMAL INSPECTION, NORMOCEPHALIC


Skin: Positive for: Normal Color, Warm, Dry


Eye Exam: Positive for: EOMI, Normal appearance, PERRL


Neck: Positive for: Normal, Painless ROM, Supple


Cardiovascular/Chest: Positive for: Regular Rate, Rhythm.  Negative for: Murmur


Respiratory: Positive for: Normal Breath Sounds.  Negative for: Respiratory 

Distress


Gastrointestinal/Abdominal: Positive for: Bowel Sounds, Soft, Tenderness (mild 

bilateral lower quadrant tenderness), Other (incisions are clean, dry and 

intact).  Negative for: Guarding, Rebound


Extremity: Positive for: Normal ROM.  Negative for: Deformity


Neurologic/Psych: Positive for: Alert, CNs II-XII, Oriented.  Negative for: 

Motor/Sensory Deficits, Aphasia, Facial Droop





- Laboratory Results


Result Diagrams: 


                                 18 09:10





                                 18 09:10





- ECG


Interpretation Of ECG: 





NSR @ 93, no ST-T changes.


O2 Sat by Pulse Oximetry: 99 (RA)


Pulse Ox Interpretation: Normal





- Progress


Re-evaluation Time: 11:45


Condition: Improved





Medical Decision Making


Medical Decision Makin:06


Impression: dizziness and dysuria s/p appendectomy 


Initial Plan: 


--EKG 


--CMP 


--CBC 


--PTT/INR 


--NS IV 


--Zofran inj 4 mg IV 


--Urine cx 


--Ortho BP 


--UA 

















------------------------------------

-------------------------------------------------------------


Scribe Attestation:


Documented by Minerva Yañez, acting as a scribe for Cori Krishnamurthy MD 





Provider Scribe Attestation:


All medical record entries made by the Scribe were at my direction and 

personally dictated by me. I have reviewed the chart and agree that the record 

accurately reflects my personal performance of the history, physical exam, 

medical decision making, and the department course for this patient. I have also

 personally directed, reviewed, and agree with the discharge instructions and 

disposition.





Disposition





- Clinical Impression


Clinical Impression: 


 Lightheadedness, UTI (urinary tract infection)








- Disposition


Referrals: 


Nelson Abraham MD [Medical Doctor] - 


Disposition: Routine/Home


Disposition Time: 11:52


Condition: IMPROVED


Prescriptions: 


Nitrofurantoin Macrocrystals [Macrobid] 100 mg PO BID #13 cap


Instructions:  Urinary Tract Infections in Adults, Dizziness, Nonvertigo, (DC)


Forms:  [facility] (English)

## 2018-11-26 VITALS — OXYGEN SATURATION: 99 %
